# Patient Record
Sex: MALE | URBAN - METROPOLITAN AREA
[De-identification: names, ages, dates, MRNs, and addresses within clinical notes are randomized per-mention and may not be internally consistent; named-entity substitution may affect disease eponyms.]

---
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
## 2022-04-03 ASSESSMENT — TONOMETRY
OD_IOP_MMHG: 21
OS_IOP_MMHG: 21

## 2022-04-03 ASSESSMENT — KERATOMETRY
OS_K1POWER_DIOPTERS: 43.75
OS_K2POWER_DIOPTERS: 43.75
OD_AXISANGLE2_DEGREES: 075
OS_AXISANGLE2_DEGREES: 090
OS_AXISANGLE_DEGREES: 180
OD_K2POWER_DIOPTERS: 45.00
OD_AXISANGLE_DEGREES: 165
OD_K1POWER_DIOPTERS: 43.50

## 2022-04-03 ASSESSMENT — VISUAL ACUITY
OS_SC: 20/20
OD_SC: 20/20